# Patient Record
Sex: FEMALE | Race: WHITE
[De-identification: names, ages, dates, MRNs, and addresses within clinical notes are randomized per-mention and may not be internally consistent; named-entity substitution may affect disease eponyms.]

---

## 2021-02-04 ENCOUNTER — HOSPITAL ENCOUNTER (EMERGENCY)
Dept: HOSPITAL 61 - ER | Age: 51
Discharge: HOME | End: 2021-02-04
Payer: SELF-PAY

## 2021-02-04 VITALS — WEIGHT: 266.76 LBS | HEIGHT: 70 IN | BODY MASS INDEX: 38.19 KG/M2

## 2021-02-04 VITALS — SYSTOLIC BLOOD PRESSURE: 142 MMHG | DIASTOLIC BLOOD PRESSURE: 69 MMHG

## 2021-02-04 DIAGNOSIS — F17.200: ICD-10-CM

## 2021-02-04 DIAGNOSIS — E78.00: ICD-10-CM

## 2021-02-04 DIAGNOSIS — Z88.1: ICD-10-CM

## 2021-02-04 DIAGNOSIS — J45.909: ICD-10-CM

## 2021-02-04 DIAGNOSIS — R05: Primary | ICD-10-CM

## 2021-02-04 DIAGNOSIS — R06.02: ICD-10-CM

## 2021-02-04 DIAGNOSIS — Z88.0: ICD-10-CM

## 2021-02-04 DIAGNOSIS — M79.10: ICD-10-CM

## 2021-02-04 PROCEDURE — U0003 INFECTIOUS AGENT DETECTION BY NUCLEIC ACID (DNA OR RNA); SEVERE ACUTE RESPIRATORY SYNDROME CORONAVIRUS 2 (SARS-COV-2) (CORONAVIRUS DISEASE [COVID-19]), AMPLIFIED PROBE TECHNIQUE, MAKING USE OF HIGH THROUGHPUT TECHNOLOGIES AS DESCRIBED BY CMS-2020-01-R: HCPCS

## 2021-02-04 PROCEDURE — 71045 X-RAY EXAM CHEST 1 VIEW: CPT

## 2021-02-04 PROCEDURE — 99284 EMERGENCY DEPT VISIT MOD MDM: CPT

## 2021-02-04 PROCEDURE — C9803 HOPD COVID-19 SPEC COLLECT: HCPCS

## 2021-02-04 NOTE — PHYS DOC
Past Medical History


Past Medical History:  Asthma, Bronchitis, High Cholesterol


Past Surgical History:  Appendectomy, Other


Additional Past Surgical Histo:  EYE SURG,


Smoking Status:  Current Every Day Smoker


Alcohol Use:  Occasionally


Drug Use:  None





General Adult


EDM:


Chief Complaint:  COUGH





HPI:


HPI:





Patient is a 51  year old female with history of asthma, bronchitis, high 

cholesterol, presenting to the ED today complaining of a productive cough, body 

aches, and is aware, symptoms began today.  She reports shortness of breath only

after coughing spit.  Denies any fever.  She also reports nasal congestion for a

week.  Reports history of smoking.





Review of Systems:


Review of Systems:


Constitutional:   Denies fever or chills. []


Eyes:   Denies change in visual acuity. []


HENT:   Reports nasal congestion, denies sore throat


Respiratory:   Reports cough and shortness of breath. [] 


Cardiovascular:   Denies chest pain or edema. [] 


GI:   Denies abdominal pain, nausea, vomiting, bloody stools or diarrhea. [] 


:  Denies dysuria. [] 


Musculoskeletal:   Denies back pain or joint pain. [] 


Integument:   Denies rash. [] 


Neurologic:   Denies headache, focal weakness or sensory changes. [] 


Psychiatric:  Denies depression or anxiety. []





Heart Score:


Risk Factors:


Risk Factors:  DM, Current or recent (<one month) smoker, HTN, HLP, family 

history of CAD, obesity.


Risk Scores:


Score 0 - 3:  2.5% MACE over next 6 weeks - Discharge Home


Score 4 - 6:  20.3% MACE over next 6 weeks - Admit for Clinical Observation


Score 7 - 10:  72.7% MACE over next 6 weeks - Early Invasive Strategies





Allergies:


Allergies:





Allergies








Coded Allergies Type Severity Reaction Last Updated Verified


 


  Penicillins Allergy Intermediate ITCHY RASH 3/30/20 Yes


 


  clarithromycin Allergy Intermediate ITCHY RASH 3/30/20 Yes











Physical Exam:


PE:





Constitutional: Well developed, well nourished, no acute distress, non-toxic 

appearance. []


HENT: Normocephalic, atraumatic, bilateral external ears normal, oropharynx 

moist, no oral exudates, nose normal. []


Eyes: PERRLA, EOMI, conjunctiva normal, no discharge. [] 


Neck: Normal range of motion, no tenderness, supple, no stridor. [] 


Cardiovascular:Heart rate regular rhythm, no murmur []


Lungs & Thorax:  Bilateral breath sounds clear to auscultation []


Abdomen: Bowel sounds normal, soft, no tenderness, no masses, no pulsatile 

masses. [] 


Skin: Warm, dry, no erythema, no rash. [] 


Back: No tenderness, no CVA tenderness. [] 


Extremities: No tenderness, no cyanosis, no clubbing, ROM intact, no edema. [] 


Neurologic: Alert and oriented X 3, normal motor function, normal sensory 

function, no focal deficits noted. []


Psychologic: Affect normal, judgement normal, mood normal. []





Current Patient Data:


Vital Signs:





                                   Vital Signs








  Date Time  Temp Pulse Resp B/P (MAP) Pulse Ox O2 Delivery O2 Flow Rate FiO2


 


2/4/21 17:10 97.9 89 20 142/69 (93) 96 Room Air  





 97.9       











EKG:


EKG:


[]





Radiology/Procedures:


Radiology/Procedures:


[]PROCEDURE: CHEST AP ONLY








INDICATION: Reason: cough / Spl. Instructions:  / History: 





COMPARISON: None.





FINDINGS:





Single view of chest obtained.


No focal airspace consolidation.  


Cardiomediastinal contour unremarkable.





No acute osseous abnormality.








IMPRESSION:





*  No focal airspace consolidation or edema.





Electronically signed by: Aime Christensen MD (2/4/2021 5:54 PM) [id number]














DICTATED and SIGNED BY:     AIME CHRISTENSEN MD


DATE:     02/04/21 [id number] 0





Course & Med Decision Making:


Course & Med Decision Making


Pertinent Labs and Imaging studies reviewed. (See chart for details)





This is a 51-year-old female patient presenting to the ED today complaining of 

cough, shortness of breath only when coughing, body aches, symptoms began today 

as well as nasal congestion for 1 week.





Patient is afebrile, vitals temperature 97.9, heart rate 89, respiration 20 on 

room air, O2 sats 96% on room air, blood pressure 122/69.





Chest x-ray interpreted by radiologist are negative for any acute findings.





Patient reports current history of smoking.  Encouraged to consider smoking 

cessation.





Supportive care measures recommended including albuterol inhaler, prednisone and

 Tessalon pulse.  Follow-up with PCP in 1 to 2 weeks.





Dragon Disclaimer:


Dragon Disclaimer:


This electronic medical record was generated, in whole or in part, using a voice

 recognition dictation system.





Departure


Departure


Impression:  


   Primary Impression:  


   Smoking addiction


   Additional Impression:  


   Cough


Disposition:  01 DC HOME SELF CARE/HOMELESS


Condition:  STABLE


Referrals:  


NO PCP (PCP)


follow up with your doctor in 1 week


Patient Instructions:  Cough, Adult, Easy-to-Read





Additional Instructions:  


You were evaluated in the emergency room, your chest x-ray is negative for any 

acute findings.  Use the prescribed medications as ordered.  Come back to the ED

 at any point symptoms worsen.  Follow-up with your doctor in 1 to 2 weeks.


Scripts


Benzonatate (TESSALON PERLE) 100 Mg Capsule


1 CAP PO TID, #30 CAP


   Prov: FERNANDO MENDIETA         2/4/21 


Albuterol Sulfate (Proair Hfa) 8.5 Gm Hfa.aer.ad


2 PUFF IH PRN Q4-6HRS PRN for wheezing for 21 Days, #1 INHALER 0 Refills


   Prov: FERNANDO MENDIETA         2/4/21 


Prednisone (PREDNISONE) 50 Mg Tablet


1 TAB PO DAILY, #5 TAB


   Prov: FERNANDO MENDIETA         2/4/21











FERNANDO MENDIETA               Feb 4, 2021 18:33

## 2021-02-04 NOTE — RAD
INDICATION: Reason: cough / Spl. Instructions:  / History: 



COMPARISON: None.



FINDINGS:



Single view of chest obtained.

No focal airspace consolidation.  

Cardiomediastinal contour unremarkable.



No acute osseous abnormality.





IMPRESSION:



*  No focal airspace consolidation or edema.



Electronically signed by: Delmer Christensen MD (2/4/2021 5:54 PM) [id number]

## 2021-02-12 ENCOUNTER — HOSPITAL ENCOUNTER (EMERGENCY)
Dept: HOSPITAL 61 - ER | Age: 51
LOS: 1 days | Discharge: HOME | End: 2021-02-13
Payer: SELF-PAY

## 2021-02-12 VITALS — WEIGHT: 268.96 LBS | BODY MASS INDEX: 38.51 KG/M2 | HEIGHT: 70 IN

## 2021-02-12 VITALS — SYSTOLIC BLOOD PRESSURE: 161 MMHG | DIASTOLIC BLOOD PRESSURE: 75 MMHG

## 2021-02-12 DIAGNOSIS — Z88.0: ICD-10-CM

## 2021-02-12 DIAGNOSIS — F17.200: ICD-10-CM

## 2021-02-12 DIAGNOSIS — J45.909: Primary | ICD-10-CM

## 2021-02-12 DIAGNOSIS — E78.00: ICD-10-CM

## 2021-02-12 DIAGNOSIS — Z88.1: ICD-10-CM

## 2021-02-12 PROCEDURE — 71046 X-RAY EXAM CHEST 2 VIEWS: CPT

## 2021-02-12 PROCEDURE — 99283 EMERGENCY DEPT VISIT LOW MDM: CPT

## 2021-02-12 PROCEDURE — 94640 AIRWAY INHALATION TREATMENT: CPT

## 2021-02-12 NOTE — PHYS DOC
Past Medical History


Past Medical History:  Asthma, Bronchitis, High Cholesterol


Past Surgical History:  Appendectomy, Other


Additional Past Surgical Histo:  EYE SURG,


Smoking Status:  Current Every Day Smoker


Alcohol Use:  Occasionally


Drug Use:  None





General Adult


EDM:


Chief Complaint:  COUGH





HPI:


HPI:





Patient is a 51 year old female who presents with states  she came 

in for a cough and was given albuterol, Tessalon Perles and prednisone for 5 

days.  She states that she is taking all of that and she is back today because 

the cough was so bad that she still cannot sleep and she will cough hard 

sometimes that she will vomit.  She states that she does get chronic bronchitis.

 She states she does not have a primary care physician at this time because she 

does not have insurance.  Patient states every time she coughs she feels like 

there is pins-and-needles in the middle of her chest.  Patient has a history of 

asthma, bronchitis, high cholesterol, and appendectomy, smoker.





Review of Systems:


Review of Systems:


Constitutional:   Denies fever or chills. []


Eyes:   Denies change in visual acuity. []


HENT:   Denies nasal congestion or sore throat. [] 


Respiratory:  +cough or +shortness of breath. [] 


Cardiovascular:   +chest pain with cough or denies edema. [] 


GI:   Denies abdominal pain, nausea, vomiting, bloody stools or diarrhea. [] 


:  Denies dysuria. [] 


Musculoskeletal:   Denies back pain or joint pain. [] 


Integument:   Denies rash. [] 


Neurologic:   Denies headache, focal weakness or sensory changes. [] 


Endocrine:   Denies polyuria or polydipsia. [] 


Lymphatic:  Denies swollen glands. [] 


Psychiatric:  Denies depression or anxiety. []





Heart Score:


Risk Factors:


Risk Factors:  DM, Current or recent (<one month) smoker, HTN, HLP, family 

history of CAD, obesity.


Risk Scores:


Score 0 - 3:  2.5% MACE over next 6 weeks - Discharge Home


Score 4 - 6:  20.3% MACE over next 6 weeks - Admit for Clinical Observation


Score 7 - 10:  72.7% MACE over next 6 weeks - Early Invasive Strategies





Current Medications:





Current Medications








 Medications


  (Trade)  Dose


 Ordered  Sig/Kareem  Start Time


 Stop Time Status Last Admin


Dose Admin


 


 Albuterol Sulfate


  (Ventolin Neb


 Soln)  2.5 mg  1X  ONCE  21 23:00


 21 23:01 UNV  














Allergies:


Allergies:





Allergies








Coded Allergies Type Severity Reaction Last Updated Verified


 


  Penicillins Allergy Intermediate ITCHY RASH 3/30/20 Yes


 


  clarithromycin Allergy Intermediate ITCHY RASH 3/30/20 Yes











Physical Exam:


PE:





Constitutional: Well developed, well nourished, no acute distress, non-toxic 

appearance. []


HENT: Normocephalic, atraumatic, bilateral external ears normal, oropharynx 

moist, no oral exudates, nose normal. []


Eyes: PERRLA, EOMI, conjunctiva normal, no discharge. [] 


Neck: Normal range of motion, no tenderness, supple, no stridor. [] 


Cardiovascular:Heart rate regular rhythm, no murmur []


Lungs & Thorax:  Bilateral breath sounds clear to auscultation []


Abdomen: Bowel sounds normal, soft, no tenderness, no masses, no pulsatile 

masses. [] 


Skin: Warm, dry, no erythema, no rash. [] 


Back: No tenderness, no CVA tenderness. [] 


Extremities: No tenderness, no cyanosis, no clubbing, ROM intact, no edema. [] 


Neurologic: Alert and oriented X 3, normal motor function, normal sensory 

function, no focal deficits noted. []


Psychologic: Affect normal, judgement normal, mood normal. 





**Normal physical exam []





EKG:


EKG:


[]





Radiology/Procedures:


Radiology/Procedures:


[]


Impression:


                            [facility]


                    [address]  [place], [place] [id number] [phone]


                                        


                                 IMAGING REPORT





                                     Signed





PATIENT: JOSEE LAW LACCOUNT: [id number]     MRN#: [id number]


: 1970           LOCATION: ER              AGE: 51


SEX: F                    EXAM DT: 21         ACCESSION#: [id number]


STATUS: REG ER            ORD. PHYSICIAN: ANETTE GORE


REASON: cough


PROCEDURE: CHEST PA & LATERAL





Chest radiograph 2021 11:38 PM





INDICATION: Cough





COMPARISON: 2021





TECHNIQUE: Frontal and lateral views of the chest are provided.





FINDINGS:





The cardiomediastinal silhouette is within normal limits.





There are no pleural effusions. There is no pulmonary vascular congestion. There

 is no pneumothorax.





Faint patchy airspace disease identified within the bilateral lower lobes.





No significant osseous abnormality is identified.





IMPRESSION:





Patchy airspace disease within the bilateral lower lobes absent to focal 

pulmonary infiltrates in the appropriate clinical setting.





Electronically signed by: Toshia Montaño MD (2021 12:18 AM) [facility]














DICTATED and SIGNED BY:     TOSHIA MONTAÑO MD


DATE:     21 [id number] 0





Course & Med Decision Making:


Course & Med Decision Making


Pertinent Labs and Imaging studies reviewed. (See chart for details)





See HPI.  Alert and oriented x4.  Lungs are clear to auscultation all lobes.  

Speaks in full complete sentences.  Skin pink warm and dry.  Ambulatory with a 

steady gait.  Patient is slightly hoarse.  Patient is given a breathing 

treatment in the ED. Patient tested negative for Covid when she was here on the 

fourth.





Patient is given a Medrol Dosepak, azithromycin of which she states she is not 

allergic to, and cough medicine.  Patient states she has enough albuterol at 

home.





[]





Dragon Disclaimer:


Dragon Disclaimer:


This electronic medical record was generated, in whole or in part, using a voice

 recognition dictation system.





Departure


Departure


Impression:  


   Primary Impression:  


   Bronchitis


Disposition:  01 DC HOME SELF CARE/HOMELESS


Condition:  STABLE


Referrals:  


NO PCP (PCP)


Patient Instructions:  Acute Bronchitis





Additional Instructions:  


Follow-up with your primary care provider if needed.  Take medication as 

prescribed and with food.  If you begin having severe shortness of breath chest 

pain return to the emergency room.


Scripts


Azithromycin (AZITHROMYCIN TABLET) 250 Mg Tablet


1 PKG PO UD for 5 Days, #6 TAB 0 Refills


   2 the first day followed by 1 for days 2-5


   Prov: ANETTE GORE         21 


Promethazine HCl/Codeine (Prometh-Codein 6.25-10 mg/5 ml) 5 Ml Syrup


5 ML PO PRN Q4-6HRS PRN for cough MDD 30 Milliliter(s), #120 ML 0 Refills


   Prov: ANETTE GORE         21 


Methylprednisolone (MEDROL) 4 Mg Tab.ds.pk


1 PKG PO UD, #1 PKG


   Prov: ANETTE GORE APRN         21











ANETTE GORE            2021 23:22

## 2021-02-13 NOTE — RAD
Chest radiograph 2/12/2021 11:38 PM



INDICATION: Cough



COMPARISON: 2/4/2021



TECHNIQUE: Frontal and lateral views of the chest are provided.



FINDINGS:



The cardiomediastinal silhouette is within normal limits.



There are no pleural effusions. There is no pulmonary vascular congestion. There is no pneumothorax.



Faint patchy airspace disease identified within the bilateral lower lobes.



No significant osseous abnormality is identified.



IMPRESSION:



Patchy airspace disease within the bilateral lower lobes absent to focal pulmonary infiltrates in the
 appropriate clinical setting.



Electronically signed by: Ida Montaño MD (2/13/2021 12:18 AM) [facility]